# Patient Record
Sex: FEMALE | Race: ASIAN | NOT HISPANIC OR LATINO
[De-identification: names, ages, dates, MRNs, and addresses within clinical notes are randomized per-mention and may not be internally consistent; named-entity substitution may affect disease eponyms.]

---

## 2017-06-12 ENCOUNTER — FORM ENCOUNTER (OUTPATIENT)
Age: 42
End: 2017-06-12

## 2017-06-13 ENCOUNTER — APPOINTMENT (OUTPATIENT)
Dept: MRI IMAGING | Facility: HOSPITAL | Age: 42
End: 2017-06-13

## 2017-06-13 ENCOUNTER — OUTPATIENT (OUTPATIENT)
Dept: OUTPATIENT SERVICES | Facility: HOSPITAL | Age: 42
LOS: 1 days | End: 2017-06-13
Payer: COMMERCIAL

## 2017-06-13 DIAGNOSIS — Z98.891 HISTORY OF UTERINE SCAR FROM PREVIOUS SURGERY: Chronic | ICD-10-CM

## 2017-06-13 DIAGNOSIS — M54.9 DORSALGIA, UNSPECIFIED: ICD-10-CM

## 2017-06-13 PROCEDURE — 72148 MRI LUMBAR SPINE W/O DYE: CPT | Mod: 26

## 2018-03-30 ENCOUNTER — EMERGENCY (EMERGENCY)
Facility: HOSPITAL | Age: 43
LOS: 1 days | Discharge: HOME | End: 2018-03-30
Attending: EMERGENCY MEDICINE | Admitting: EMERGENCY MEDICINE
Payer: COMMERCIAL

## 2018-03-30 VITALS
HEART RATE: 75 BPM | SYSTOLIC BLOOD PRESSURE: 103 MMHG | RESPIRATION RATE: 16 BRPM | TEMPERATURE: 98 F | DIASTOLIC BLOOD PRESSURE: 69 MMHG | OXYGEN SATURATION: 99 %

## 2018-03-30 VITALS
SYSTOLIC BLOOD PRESSURE: 106 MMHG | RESPIRATION RATE: 16 BRPM | TEMPERATURE: 98 F | DIASTOLIC BLOOD PRESSURE: 81 MMHG | OXYGEN SATURATION: 98 % | HEART RATE: 78 BPM

## 2018-03-30 DIAGNOSIS — Z98.891 HISTORY OF UTERINE SCAR FROM PREVIOUS SURGERY: Chronic | ICD-10-CM

## 2018-03-30 LAB
ALBUMIN SERPL ELPH-MCNC: 4.5 G/DL — SIGNIFICANT CHANGE UP (ref 3.3–5)
ALP SERPL-CCNC: 86 U/L — SIGNIFICANT CHANGE UP (ref 40–120)
ALT FLD-CCNC: 19 U/L — SIGNIFICANT CHANGE UP (ref 4–33)
APPEARANCE UR: CLEAR — SIGNIFICANT CHANGE UP
APTT BLD: 38.6 SEC — HIGH (ref 27.5–37.4)
AST SERPL-CCNC: 18 U/L — SIGNIFICANT CHANGE UP (ref 4–32)
BACTERIA # UR AUTO: SIGNIFICANT CHANGE UP
BASOPHILS # BLD AUTO: 0.07 K/UL — SIGNIFICANT CHANGE UP (ref 0–0.2)
BASOPHILS NFR BLD AUTO: 1 % — SIGNIFICANT CHANGE UP (ref 0–2)
BILIRUB SERPL-MCNC: 0.2 MG/DL — SIGNIFICANT CHANGE UP (ref 0.2–1.2)
BILIRUB UR-MCNC: NEGATIVE — SIGNIFICANT CHANGE UP
BLOOD UR QL VISUAL: HIGH
BUN SERPL-MCNC: 14 MG/DL — SIGNIFICANT CHANGE UP (ref 7–23)
CALCIUM SERPL-MCNC: 9.7 MG/DL — SIGNIFICANT CHANGE UP (ref 8.4–10.5)
CHLORIDE SERPL-SCNC: 101 MMOL/L — SIGNIFICANT CHANGE UP (ref 98–107)
CK MB BLD-MCNC: 1.56 NG/ML — SIGNIFICANT CHANGE UP (ref 1–4.7)
CK SERPL-CCNC: 104 U/L — SIGNIFICANT CHANGE UP (ref 25–170)
CO2 SERPL-SCNC: 27 MMOL/L — SIGNIFICANT CHANGE UP (ref 22–31)
COLOR SPEC: SIGNIFICANT CHANGE UP
CREAT SERPL-MCNC: 0.8 MG/DL — SIGNIFICANT CHANGE UP (ref 0.5–1.3)
D DIMER BLD IA.RAPID-MCNC: < 150 NG/ML — SIGNIFICANT CHANGE UP
EOSINOPHIL # BLD AUTO: 0.21 K/UL — SIGNIFICANT CHANGE UP (ref 0–0.5)
EOSINOPHIL NFR BLD AUTO: 2.9 % — SIGNIFICANT CHANGE UP (ref 0–6)
GLUCOSE SERPL-MCNC: 93 MG/DL — SIGNIFICANT CHANGE UP (ref 70–99)
GLUCOSE UR-MCNC: NEGATIVE — SIGNIFICANT CHANGE UP
HCT VFR BLD CALC: 38.6 % — SIGNIFICANT CHANGE UP (ref 34.5–45)
HGB BLD-MCNC: 13 G/DL — SIGNIFICANT CHANGE UP (ref 11.5–15.5)
IMM GRANULOCYTES # BLD AUTO: 0.01 # — SIGNIFICANT CHANGE UP
IMM GRANULOCYTES NFR BLD AUTO: 0.1 % — SIGNIFICANT CHANGE UP (ref 0–1.5)
INR BLD: 0.97 — SIGNIFICANT CHANGE UP (ref 0.88–1.17)
KETONES UR-MCNC: NEGATIVE — SIGNIFICANT CHANGE UP
LEUKOCYTE ESTERASE UR-ACNC: NEGATIVE — SIGNIFICANT CHANGE UP
LYMPHOCYTES # BLD AUTO: 1.97 K/UL — SIGNIFICANT CHANGE UP (ref 1–3.3)
LYMPHOCYTES # BLD AUTO: 27 % — SIGNIFICANT CHANGE UP (ref 13–44)
MCHC RBC-ENTMCNC: 28.8 PG — SIGNIFICANT CHANGE UP (ref 27–34)
MCHC RBC-ENTMCNC: 33.7 % — SIGNIFICANT CHANGE UP (ref 32–36)
MCV RBC AUTO: 85.6 FL — SIGNIFICANT CHANGE UP (ref 80–100)
MONOCYTES # BLD AUTO: 0.55 K/UL — SIGNIFICANT CHANGE UP (ref 0–0.9)
MONOCYTES NFR BLD AUTO: 7.5 % — SIGNIFICANT CHANGE UP (ref 2–14)
MUCOUS THREADS # UR AUTO: SIGNIFICANT CHANGE UP
NEUTROPHILS # BLD AUTO: 4.48 K/UL — SIGNIFICANT CHANGE UP (ref 1.8–7.4)
NEUTROPHILS NFR BLD AUTO: 61.5 % — SIGNIFICANT CHANGE UP (ref 43–77)
NITRITE UR-MCNC: NEGATIVE — SIGNIFICANT CHANGE UP
NRBC # FLD: 0 — SIGNIFICANT CHANGE UP
PH UR: 7 — SIGNIFICANT CHANGE UP (ref 4.6–8)
PLATELET # BLD AUTO: 297 K/UL — SIGNIFICANT CHANGE UP (ref 150–400)
PMV BLD: 10.3 FL — SIGNIFICANT CHANGE UP (ref 7–13)
POTASSIUM SERPL-MCNC: 4.1 MMOL/L — SIGNIFICANT CHANGE UP (ref 3.5–5.3)
POTASSIUM SERPL-SCNC: 4.1 MMOL/L — SIGNIFICANT CHANGE UP (ref 3.5–5.3)
PROT SERPL-MCNC: 8 G/DL — SIGNIFICANT CHANGE UP (ref 6–8.3)
PROT UR-MCNC: NEGATIVE MG/DL — SIGNIFICANT CHANGE UP
PROTHROM AB SERPL-ACNC: 10.8 SEC — SIGNIFICANT CHANGE UP (ref 9.8–13.1)
RBC # BLD: 4.51 M/UL — SIGNIFICANT CHANGE UP (ref 3.8–5.2)
RBC # FLD: 11.9 % — SIGNIFICANT CHANGE UP (ref 10.3–14.5)
RBC CASTS # UR COMP ASSIST: SIGNIFICANT CHANGE UP (ref 0–?)
SODIUM SERPL-SCNC: 139 MMOL/L — SIGNIFICANT CHANGE UP (ref 135–145)
SP GR SPEC: 1.02 — SIGNIFICANT CHANGE UP (ref 1–1.04)
SQUAMOUS # UR AUTO: SIGNIFICANT CHANGE UP
TRANS CELLS #/AREA URNS HPF: <1 — SIGNIFICANT CHANGE UP
TROPONIN T SERPL-MCNC: < 0.06 NG/ML — SIGNIFICANT CHANGE UP (ref 0–0.06)
TROPONIN T SERPL-MCNC: < 0.06 NG/ML — SIGNIFICANT CHANGE UP (ref 0–0.06)
TSH SERPL-MCNC: 1.66 UIU/ML — SIGNIFICANT CHANGE UP (ref 0.27–4.2)
TSH SERPL-MCNC: 1.7 UIU/ML — SIGNIFICANT CHANGE UP (ref 0.27–4.2)
UROBILINOGEN FLD QL: NORMAL MG/DL — SIGNIFICANT CHANGE UP
WBC # BLD: 7.29 K/UL — SIGNIFICANT CHANGE UP (ref 3.8–10.5)
WBC # FLD AUTO: 7.29 K/UL — SIGNIFICANT CHANGE UP (ref 3.8–10.5)
WBC UR QL: SIGNIFICANT CHANGE UP (ref 0–?)

## 2018-03-30 PROCEDURE — 93010 ELECTROCARDIOGRAM REPORT: CPT | Mod: 59

## 2018-03-30 PROCEDURE — 71046 X-RAY EXAM CHEST 2 VIEWS: CPT | Mod: 26

## 2018-03-30 PROCEDURE — 99284 EMERGENCY DEPT VISIT MOD MDM: CPT

## 2018-03-30 PROCEDURE — 76830 TRANSVAGINAL US NON-OB: CPT | Mod: 26

## 2018-03-30 PROCEDURE — 99218: CPT | Mod: 25

## 2018-03-30 PROCEDURE — 93306 TTE W/DOPPLER COMPLETE: CPT | Mod: 26

## 2018-03-30 PROCEDURE — 74176 CT ABD & PELVIS W/O CONTRAST: CPT | Mod: 26

## 2018-03-30 RX ORDER — ASPIRIN/CALCIUM CARB/MAGNESIUM 324 MG
81 TABLET ORAL ONCE
Qty: 0 | Refills: 0 | Status: DISCONTINUED | OUTPATIENT
Start: 2018-03-30 | End: 2018-04-03

## 2018-03-30 RX ORDER — SODIUM CHLORIDE 9 MG/ML
1000 INJECTION INTRAMUSCULAR; INTRAVENOUS; SUBCUTANEOUS ONCE
Qty: 0 | Refills: 0 | Status: COMPLETED | OUTPATIENT
Start: 2018-03-30 | End: 2018-03-30

## 2018-03-30 RX ORDER — ACETAMINOPHEN 500 MG
1000 TABLET ORAL ONCE
Qty: 0 | Refills: 0 | Status: COMPLETED | OUTPATIENT
Start: 2018-03-30 | End: 2018-03-30

## 2018-03-30 RX ORDER — KETOROLAC TROMETHAMINE 30 MG/ML
15 SYRINGE (ML) INJECTION ONCE
Qty: 0 | Refills: 0 | Status: DISCONTINUED | OUTPATIENT
Start: 2018-03-30 | End: 2018-03-30

## 2018-03-30 RX ADMIN — Medication 15 MILLIGRAM(S): at 09:29

## 2018-03-30 RX ADMIN — SODIUM CHLORIDE 1000 MILLILITER(S): 9 INJECTION INTRAMUSCULAR; INTRAVENOUS; SUBCUTANEOUS at 12:46

## 2018-03-30 RX ADMIN — Medication 1000 MILLIGRAM(S): at 11:51

## 2018-03-30 RX ADMIN — Medication 400 MILLIGRAM(S): at 09:29

## 2018-03-30 RX ADMIN — Medication 15 MILLIGRAM(S): at 11:51

## 2018-03-30 NOTE — CONSULT NOTE ADULT - SUBJECTIVE AND OBJECTIVE BOX
Patient seen and evaluated at bedside    Chief Complaint:    HPI:      PMH:   No pertinent past medical history      PSH:   S/P   No significant past surgical history      Medications:   aspirin Disintegrating Tablet 81 milliGRAM(s) Oral once      Allergies:  No Known Allergies      FAMILY HISTORY:      Social History:  Smoking History:  Alcohol Use:  Drug Use:    Review of Systems:  REVIEW OF SYSTEMS:    CONSTITUTIONAL: No weakness, fevers or chills  EYES/ENT: No visual changes;  No dysphagia  NECK: No pain or stiffness  RESPIRATORY: No cough, wheezing, hemoptysis; No shortness of breath  CARDIOVASCULAR: No chest pain or palpitations; No lower extremity edema  GASTROINTESTINAL: No abdominal or epigastric pain. No nausea, vomiting, or hematemesis; No diarrhea or constipation. No melena or hematochezia.  BACK: No back pain  GENITOURINARY: No dysuria, frequency or hematuria  NEUROLOGICAL: No numbness or weakness  SKIN: No itching, burning, rashes, or lesions   All other review of systems is negative unless indicated above.    Physical Exam:  T(F): 98 (-30), Max: 98 (-)  HR: 82 (-30) (78 - 82)  BP: 116/73 (-) (106/81 - 116/73)  RR: 20 (-30)  SpO2: 100% (-30)  GENERAL: No acute distress, well-developed  HEAD:  Atraumatic, Normocephalic  ENT: EOMI, PERRLA, conjunctiva and sclera clear, Neck supple, No JVD, moist mucosa  CHEST/LUNG: Clear to auscultation bilaterally; No wheeze, equal breath sounds bilaterally   BACK: No spinal tenderness  HEART: Regular rate and rhythm; No murmurs, rubs, or gallops  ABDOMEN: Soft, Nontender, Nondistended; Bowel sounds present  EXTREMITIES:  No clubbing, cyanosis, or edema  PSYCH: Nl behavior, nl affect  NEUROLOGY: AAOx3, non-focal, cranial nerves intact  SKIN: Normal color, No rashes or lesions  LINES:    Cardiovascular Diagnostic Testing:    ECG: Personally reviewed    Echo:    Stress Testing:    Cath:    Interpretation of Telemetry:    Imaging:    Labs: Personally reviewed                        13.0   7.29  )-----------( 297      ( 30 Mar 2018 09:49 )             38.6         139  |  101  |  14  ----------------------------<  93  4.1   |  27  |  0.80    Ca    9.7      30 Mar 2018 09:07    TPro  8.0  /  Alb  4.5  /  TBili  0.2  /  DBili  x   /  AST  18  /  ALT  19  /  AlkPhos  86      PT/INR - ( 30 Mar 2018 09:49 )   PT: 10.8 SEC;   INR: 0.97          PTT - ( 30 Mar 2018 09:49 )  PTT:38.6 SEC  CARDIAC MARKERS ( 30 Mar 2018 14:00 )  x     / < 0.06 ng/mL / x     / x     / x      CARDIAC MARKERS ( 30 Mar 2018 09:07 )  x     / < 0.06 ng/mL / 104 u/L / 1.56 ng/mL / x                Thyroid Stimulating Hormone, Serum: 1.66 uIU/mL ( @ 09:49)  Thyroid Stimulating Hormone, Serum: 1.70 uIU/mL ( @ 09:07) Patient seen and evaluated at bedside    Chief Complaint:  "extra beats"    HPI:  43F with no medical history who presents with about a week of "extra beats" and chest discomfort. She said every minute or two she would feel an extra beat or two and would get "chest discomfort" and the feeling of "lump in throat" associated with extra beats. No SOB. Does not worsen with exertion. Patient drinks 1 cup coffee/day and has not increased caffeine intake recently. Patient reports having URI symptoms for past week, her kids are sick at home. No cardiac history, never had any cardiac workup.  CE negative x1 in ED, D-dimer negative. Patient with PVCs on tele associated with her symptoms.    PMH:   No pertinent past medical history      PSH:   S/P   No significant past surgical history      Medications:   aspirin Disintegrating Tablet 81 milliGRAM(s) Oral once      Allergies:  No Known Allergies      FAMILY HISTORY:  Mother had CHF in 60's      Social History:  Smoking History: denies  Alcohol Use: occasional  Drug Use: denies    Review of Systems:  REVIEW OF SYSTEMS:    CONSTITUTIONAL: No weakness, fevers or chills  EYES/ENT: No visual changes;  No dysphagia  NECK: No pain or stiffness  RESPIRATORY: No cough, wheezing, hemoptysis; No shortness of breath  CARDIOVASCULAR: No chest pain or +palpitations; No lower extremity edema  GASTROINTESTINAL: No abdominal or epigastric pain. No nausea, vomiting, or hematemesis; No diarrhea or constipation. No melena or hematochezia.  BACK: No back pain  GENITOURINARY: No dysuria, frequency or hematuria  NEUROLOGICAL: No numbness or weakness  SKIN: No itching, burning, rashes, or lesions   All other review of systems is negative unless indicated above.    Physical Exam:  T(F): 98 (-30), Max: 98 (-)  HR: 82 (-30) (78 - 82)  BP: 116/73 (-30) (106/81 - 116/73)  RR: 20 (-30)  SpO2: 100% (-30)  GENERAL: No acute distress  HEAD:  Atraumatic, Normocephalic  ENT: EOMI, PERRLA, conjunctiva and sclera clear, Neck supple, No JVD, moist mucosa  CHEST/LUNG: Clear to auscultation bilaterally; No wheeze, equal breath sounds bilaterally   HEART: Regular rate and rhythm; No murmurs, rubs, or gallops  ABDOMEN: Soft, Nontender, Nondistended; Bowel sounds present  EXTREMITIES:  No clubbing, cyanosis, or edema  PSYCH: Nl behavior, nl affect  NEUROLOGY: AAOx3, non-focal, cranial nerves intact          Labs: Personally reviewed                        13.0   7.29  )-----------( 297      ( 30 Mar 2018 09:49 )             38.6         139  |  101  |  14  ----------------------------<  93  4.1   |  27  |  0.80    Ca    9.7      30 Mar 2018 09:07    TPro  8.0  /  Alb  4.5  /  TBili  0.2  /  DBili  x   /  AST  18  /  ALT  19  /  AlkPhos  86      PT/INR - ( 30 Mar 2018 09:49 )   PT: 10.8 SEC;   INR: 0.97        PTT - ( 30 Mar 2018 09:49 )  PTT:38.6 SEC  CARDIAC MARKERS ( 30 Mar 2018 14:00 )  x     / < 0.06 ng/mL / x     / x     / x      CARDIAC MARKERS ( 30 Mar 2018 09:07 )  x     / < 0.06 ng/mL / 104 u/L / 1.56 ng/mL / x                Thyroid Stimulating Hormone, Serum: 1.66 uIU/mL ( @ 09:49)  Thyroid Stimulating Hormone, Serum: 1.70 uIU/mL ( @ 09:07)

## 2018-03-30 NOTE — ED PROVIDER NOTE - ATTENDING CONTRIBUTION TO CARE
Attending Statement: I have personally seen and examined this patient. I have fully participated in the care of this patient. I have reviewed all pertinent clinical information, including history physical exam, plan and the Resident's note and agree except as noted  44yo F no sig pmhx pw left " paraspinal pain" and LLQ pain "by my adnexa" worse since last night. Has had intermittent lower left back pain for "long time" MRI cw a herniated disc in the lumbar spine. However over the last week  it has increased, and last night "couldn't get comfortable" "intense pain" not alleviated by advil. Denies any radiation of pain down the leg. No numbness/weakness. No urine or bowel incontinence. no fever or chills. no nausea/vomit/diarrhea. no vag bleeding or discharge. no dysuria/freq or urgency. LMP just ended, denies preg. Denies hx of STD. Also endorse a week of "fluttering" intermittent, every 5 min, feels "tightness" midsternal, non radiating. No associated nausea/SOB. No pain. No diaphoresis. +OCP. non smoker. no travel. no leg swelling or calf tenderness. no sig family hx of CAD.   Vital signs noted. EOMI. MMM. no goiter. normal S1-S2 No resp distress. able to speak in full and clear sentences. no wheeze, rales or stridor. soft tender at the LLQ  abdomen. no  rebound. no guarding. no sign of trauma. no CVAT Pelvic dw w resident   no pedal edema. no calf tenderness. normal pulses bilateral feet. neg straight leg bl. nl gait  plan ekg, tele monitoring, labs, cxr, US rule out torsion, ct abdomen stone hunt, IVF, pain med, re assess Attending Statement: I have personally seen and examined this patient. I have fully participated in the care of this patient. I have reviewed all pertinent clinical information, including history physical exam, plan and the Resident's note and agree except as noted  42yo F no sig pmhx pw left " paraspinal pain" and LLQ pain "by my adnexa" worse since last night. Has had intermittent lower left back pain for "long time" MRI cw a herniated disc in the lumbar spine. However over the last week  it has increased, and last night "couldn't get comfortable" "intense pain" not alleviated by advil. Denies any radiation of pain down the leg. No numbness/weakness. No urine or bowel incontinence. no fever or chills. no nausea/vomit/diarrhea. no vag bleeding or discharge. no dysuria/freq or urgency. LMP just ended, denies preg. Denies hx of STD. Also endorse a week of "fluttering" intermittent, every 5 min, feels "tightness" midsternal, non radiating. No associated nausea/SOB. No pain. No diaphoresis. +OCP. non smoker. no travel. no leg swelling or calf tenderness. no sig family hx of CAD.   Vital signs noted. EOMI. MMM. no goiter. normal S1-S2 No resp distress. able to speak in full and clear sentences. no wheeze, rales or stridor. soft tender at the LLQ  abdomen. no  rebound. no guarding. no sign of trauma. no CVAT Pelvic dw w resident   no pedal edema. no calf tenderness. normal pulses bilateral feet. neg straight leg bl. nl gait  plan ekg, tele monitoring, labs, cxr, US rule out torsion, ct abdomen stone hunt, IVF, pain med, re assess  Heart score zero, Unable to PERC out because pt on OCP.

## 2018-03-30 NOTE — ED CDU PROVIDER INITIAL DAY NOTE - ATTENDING CONTRIBUTION TO CARE
Attending Note (Jacobo): patient complaining of chest pain and palpitations.  sent to cdu for tele and stress.  ambulatory without dyspnea.

## 2018-03-30 NOTE — ED ADULT TRIAGE NOTE - CHIEF COMPLAINT QUOTE
pt c/o feeling fluttering in substernal chest x 1 week. pt also c/o left lower back and left pelvic pain since last night.  denies any pmhx

## 2018-03-30 NOTE — ED CDU PROVIDER DISPOSITION NOTE - CLINICAL COURSE
Attending Note (Jacobo): patient admitted to cdu for tele and stress and final cardiology recs.  well appearing. steady gait.  echo without acute pathology.  safe for dc.

## 2018-03-30 NOTE — ED PROVIDER NOTE - MEDICAL DECISION MAKING DETAILS
43F p/w left lower back pain and LLQ pain, with palpitations. EKG NSR. Nephrolithiasis vs. ovarian cyst/torsion. Will get TVUS, CT noncon. CBC, CMP, CEx1, TSH.

## 2018-03-30 NOTE — ED CDU PROVIDER INITIAL DAY NOTE - OBJECTIVE STATEMENT
44 y/o female no pmh c/o left lower back pain x4 days. Pt admits that pain radiates to LLQ, denies aggravating or relieving factors. Pt states that she cannot get comfortable. Pt also c/o intermittent palpitations and "skipped heart beats" for the last week which is new. Denies chest pain, sob, diaphoresis, n/v/d, numbness, tingling, weakness, dysuria, hematuria, fever or chills

## 2018-03-30 NOTE — ED PROVIDER NOTE - PROGRESS NOTE DETAILS
pain has improved, still feeling "fluttering" will get a second CE. Cardiology consult called. pt offered CDU for tele monitoring and echo. Cardiology saw pt will get echo today and then dc home. pt aware and willing to be in CDU

## 2018-03-30 NOTE — ED PROVIDER NOTE - OBJECTIVE STATEMENT
43F Radiologist h/o lumbar disc herniation presents with new acute onset dull left lower back pain for last few days but worsened last night and now radiating to abdomen LLQ. Pain 8/10, unable to get comfortable, advil didn't help. Patient also noted "palpitations" and skipped heart beats for last week, never happened before. No chest pain, SOB. No fevers, chill, N/V/D, hematuria, dysuria, polyuria, blood in stool.

## 2018-03-30 NOTE — ED ADULT NURSE REASSESSMENT NOTE - NS ED NURSE REASSESS COMMENT FT1
Pt vs wnl pt is NSR on monitor with occasional pvc. Pt denies chest pain co palpitations . Pt now going to echo and cdu .

## 2018-03-30 NOTE — CONSULT NOTE ADULT - ASSESSMENT
43F with no medical history who presents with about a week of "extra beats" and chest discomfort. CE negative x1 in ED, D-dimer negative. Patient with PVCs on tele associated with her symptoms.    -check TTE, if no abnormalities then would d/c home with follow up  -encouraged patient to exercise as can help with PVC burden

## 2018-05-07 ENCOUNTER — RESULT REVIEW (OUTPATIENT)
Age: 43
End: 2018-05-07

## 2019-11-14 ENCOUNTER — RESULT REVIEW (OUTPATIENT)
Age: 44
End: 2019-11-14

## 2021-09-30 ENCOUNTER — RESULT REVIEW (OUTPATIENT)
Age: 46
End: 2021-09-30

## 2022-11-30 ENCOUNTER — RESULT REVIEW (OUTPATIENT)
Age: 47
End: 2022-11-30
